# Patient Record
Sex: FEMALE | Race: OTHER | NOT HISPANIC OR LATINO | URBAN - METROPOLITAN AREA
[De-identification: names, ages, dates, MRNs, and addresses within clinical notes are randomized per-mention and may not be internally consistent; named-entity substitution may affect disease eponyms.]

---

## 2021-02-11 ENCOUNTER — OFFICE VISIT (OUTPATIENT)
Dept: URBAN - METROPOLITAN AREA CLINIC 118 | Facility: CLINIC | Age: 71
End: 2021-02-11
Payer: COMMERCIAL

## 2021-02-11 DIAGNOSIS — Z86.010 PERSONAL HISTORY OF COLON POLYPS: ICD-10-CM

## 2021-02-11 DIAGNOSIS — R10.13 EPIGASTRIC ABDOMINAL PAIN: ICD-10-CM

## 2021-02-11 DIAGNOSIS — R14.0 BLOATING: ICD-10-CM

## 2021-02-11 PROBLEM — 428283002 HISTORY OF POLYP OF COLON: Status: ACTIVE | Noted: 2021-02-11

## 2021-02-11 PROCEDURE — 1036F TOBACCO NON-USER: CPT | Performed by: INTERNAL MEDICINE

## 2021-02-11 PROCEDURE — 3017F COLORECTAL CA SCREEN DOC REV: CPT | Performed by: INTERNAL MEDICINE

## 2021-02-11 PROCEDURE — 99214 OFFICE O/P EST MOD 30 MIN: CPT | Performed by: INTERNAL MEDICINE

## 2021-02-11 PROCEDURE — G8417 CALC BMI ABV UP PARAM F/U: HCPCS | Performed by: INTERNAL MEDICINE

## 2021-02-11 PROCEDURE — 99215 OFFICE O/P EST HI 40 MIN: CPT | Performed by: INTERNAL MEDICINE

## 2021-02-11 PROCEDURE — G9903 PT SCRN TBCO ID AS NON USER: HCPCS | Performed by: INTERNAL MEDICINE

## 2021-02-11 PROCEDURE — G8482 FLU IMMUNIZE ORDER/ADMIN: HCPCS | Performed by: INTERNAL MEDICINE

## 2021-02-11 PROCEDURE — G8427 DOCREV CUR MEDS BY ELIG CLIN: HCPCS | Performed by: INTERNAL MEDICINE

## 2021-02-11 RX ORDER — SUCRALFATE 1 G/1
DISSOLVE 1 TABLET IN WATER AND TAKE ON AN EMPTY STOMACH TABLET ORAL TID
Qty: 270 TABLET | Refills: 0 | OUTPATIENT
Start: 2021-02-11 | End: 2021-05-12

## 2021-02-11 NOTE — HPI-TODAY'S VISIT:
Pleasant 70-year-old female who presents with a few months worth of abdominal pain She reports having epigastric abdominal pain that is sharp and burning in quality worse with eating and better with nothing she was given a PPI to take with no improvement in symptoms She denies melena or weight loss or early satiety Denies history of peptic ulcer disease or family history of peptic ulcer disease with or gastric malignancy

## 2021-03-11 ENCOUNTER — CLAIMS CREATED FROM THE CLAIM WINDOW (OUTPATIENT)
Dept: URBAN - METROPOLITAN AREA CLINIC 4 | Facility: CLINIC | Age: 71
End: 2021-03-11
Payer: COMMERCIAL

## 2021-03-11 ENCOUNTER — OFFICE VISIT (OUTPATIENT)
Dept: URBAN - METROPOLITAN AREA SURGERY CENTER 23 | Facility: SURGERY CENTER | Age: 71
End: 2021-03-11
Payer: COMMERCIAL

## 2021-03-11 DIAGNOSIS — K31.7 POLYP OF STOMACH AND DUODENUM: ICD-10-CM

## 2021-03-11 DIAGNOSIS — R10.13 ABDOMINAL DISCOMFORT, EPIGASTRIC: ICD-10-CM

## 2021-03-11 DIAGNOSIS — K31.89 DILATION OF STOMACH: ICD-10-CM

## 2021-03-11 DIAGNOSIS — K22.8 COLUMNAR-LINED ESOPHAGUS: ICD-10-CM

## 2021-03-11 DIAGNOSIS — K31.89 ACQUIRED DEFORMITY OF DUODENUM: ICD-10-CM

## 2021-03-11 PROCEDURE — G8907 PT DOC NO EVENTS ON DISCHARG: HCPCS | Performed by: INTERNAL MEDICINE

## 2021-03-11 PROCEDURE — 88305 TISSUE EXAM BY PATHOLOGIST: CPT | Performed by: PATHOLOGY

## 2021-03-11 PROCEDURE — 43239 EGD BIOPSY SINGLE/MULTIPLE: CPT | Performed by: INTERNAL MEDICINE

## 2021-03-11 PROCEDURE — 88312 SPECIAL STAINS GROUP 1: CPT | Performed by: PATHOLOGY

## 2021-03-11 RX ORDER — DICYCLOMINE HYDROCHLORIDE 10 MG/1
1 TABLET CAPSULE ORAL THREE TIMES A DAY
Qty: 90 TABLET | Refills: 1 | OUTPATIENT
Start: 2021-03-11 | End: 2021-05-10

## 2021-03-11 RX ORDER — SUCRALFATE 1 G/1
DISSOLVE 1 TABLET IN WATER AND TAKE ON AN EMPTY STOMACH TABLET ORAL TID
Qty: 270 TABLET | Refills: 0 | Status: ACTIVE | COMMUNITY
Start: 2021-02-11 | End: 2021-05-12

## 2021-03-18 ENCOUNTER — OFFICE VISIT (OUTPATIENT)
Dept: URBAN - METROPOLITAN AREA SURGERY CENTER 23 | Facility: SURGERY CENTER | Age: 71
End: 2021-03-18

## 2024-01-29 ENCOUNTER — LAB OUTSIDE AN ENCOUNTER (OUTPATIENT)
Dept: URBAN - METROPOLITAN AREA CLINIC 109 | Facility: CLINIC | Age: 74
End: 2024-01-29

## 2024-01-29 ENCOUNTER — OFFICE VISIT (OUTPATIENT)
Dept: URBAN - METROPOLITAN AREA CLINIC 109 | Facility: CLINIC | Age: 74
End: 2024-01-29
Payer: COMMERCIAL

## 2024-01-29 ENCOUNTER — DASHBOARD ENCOUNTERS (OUTPATIENT)
Age: 74
End: 2024-01-29

## 2024-01-29 VITALS
BODY MASS INDEX: 25.42 KG/M2 | HEIGHT: 71 IN | HEART RATE: 64 BPM | TEMPERATURE: 97 F | WEIGHT: 181.6 LBS | DIASTOLIC BLOOD PRESSURE: 70 MMHG

## 2024-01-29 DIAGNOSIS — R10.13 EPIGASTRIC ABDOMINAL PAIN: ICD-10-CM

## 2024-01-29 DIAGNOSIS — Z86.010 PERSONAL HISTORY OF COLON POLYPS: ICD-10-CM

## 2024-01-29 DIAGNOSIS — R14.0 BLOATING: ICD-10-CM

## 2024-01-29 PROCEDURE — 99204 OFFICE O/P NEW MOD 45 MIN: CPT

## 2024-01-29 PROCEDURE — 99214 OFFICE O/P EST MOD 30 MIN: CPT

## 2024-01-29 RX ORDER — PANTOPRAZOLE SODIUM 40 MG/1
1 TABLET TABLET, DELAYED RELEASE ORAL
Qty: 30 | Refills: 5 | OUTPATIENT
Start: 2024-01-29

## 2024-01-29 RX ORDER — SUCRALFATE 1 G/1
1 TABLET ON AN EMPTY STOMACH TABLET ORAL TWICE A DAY
Qty: 60 | Refills: 1 | OUTPATIENT
Start: 2024-01-29 | End: 2024-03-29

## 2024-01-29 RX ORDER — HYOSCYAMINE SULFATE 0.12 MG/1
1 TABLET UNDER THE TONGUE AND ALLOW TO DISSOLVE TABLET SUBLINGUAL
Qty: 90 | Refills: 3 | OUTPATIENT
Start: 2024-01-29 | End: 2024-05-28

## 2024-01-29 NOTE — HPI-TODAY'S VISIT:
Pt with hx of IBS and personal hx colon polyps referred by Dr. Nj Ramos for colon consult and eval of GI sxs. Last OV 2/2021 for similar epigastric pain. Previously rx'd carafate and dicyclomine. Denies compliance states she does not like to take medications. Still with epigastric pain almost daily lasting 2-3 hrs mod in severity. Unrelated to food intake per pt, some improvement after BM. Not currently taking Dicyclomine but states she recently had tried taking for a couple weeks prior to not taking for few years. Admits GERD and indigestion daily. No dysphagia/odynophagia. Also inconsistently taking Pantoprazole 20 mg bid, hasn't taken in couple months. Recent workup negative including hydrogen breath test and abdominal US.  Labs Nov 2023 unremarkable. Last EGD 3/11/2021 normal. Last colonoscopy 8/2018 with TA polyps recommended repeat in 5 years. No N/V, changes in BMs, rectal bleeding, melena, fever/chills, or unintentional weight loss. Has 1 formed BM/daily.

## 2024-01-29 NOTE — PHYSICAL EXAM NECK/THYROID:
Normal appearance, nontender to palpation, no deformities, trachea midline, thyroid normal size, no palpable nodules or masses, no lymphadenopathy
negative

## 2024-02-15 ENCOUNTER — COLON (OUTPATIENT)
Dept: URBAN - METROPOLITAN AREA SURGERY CENTER 23 | Facility: SURGERY CENTER | Age: 74
End: 2024-02-15

## 2024-03-07 ENCOUNTER — LAB (OUTPATIENT)
Dept: URBAN - METROPOLITAN AREA CLINIC 4 | Facility: CLINIC | Age: 74
End: 2024-03-07
Payer: COMMERCIAL

## 2024-03-07 ENCOUNTER — COLON (OUTPATIENT)
Dept: URBAN - METROPOLITAN AREA SURGERY CENTER 23 | Facility: SURGERY CENTER | Age: 74
End: 2024-03-07
Payer: COMMERCIAL

## 2024-03-07 DIAGNOSIS — D12.4 BENIGN NEOPLASM OF DESCENDING COLON: ICD-10-CM

## 2024-03-07 DIAGNOSIS — Z86.010 ADENOMAS PERSONAL HISTORY OF COLONIC POLYPS: ICD-10-CM

## 2024-03-07 DIAGNOSIS — D12.4 ADENOMA OF DESCENDING COLON: ICD-10-CM

## 2024-03-07 PROCEDURE — 45385 COLONOSCOPY W/LESION REMOVAL: CPT | Performed by: INTERNAL MEDICINE

## 2024-03-07 PROCEDURE — 88305 TISSUE EXAM BY PATHOLOGIST: CPT | Performed by: PATHOLOGY

## 2024-03-07 RX ORDER — PANTOPRAZOLE SODIUM 40 MG/1
1 TABLET TABLET, DELAYED RELEASE ORAL
Qty: 30 | Refills: 5 | Status: ACTIVE | COMMUNITY
Start: 2024-01-29

## 2024-03-07 RX ORDER — SUCRALFATE 1 G/1
1 TABLET ON AN EMPTY STOMACH TABLET ORAL TWICE A DAY
Qty: 60 | Refills: 1 | Status: ACTIVE | COMMUNITY
Start: 2024-01-29 | End: 2024-03-29

## 2024-03-07 RX ORDER — HYOSCYAMINE SULFATE 0.12 MG/1
1 TABLET UNDER THE TONGUE AND ALLOW TO DISSOLVE TABLET SUBLINGUAL
Qty: 90 | Refills: 3 | Status: ACTIVE | COMMUNITY
Start: 2024-01-29 | End: 2024-05-28